# Patient Record
Sex: MALE | Race: WHITE | NOT HISPANIC OR LATINO | Employment: PART TIME | URBAN - NONMETROPOLITAN AREA
[De-identification: names, ages, dates, MRNs, and addresses within clinical notes are randomized per-mention and may not be internally consistent; named-entity substitution may affect disease eponyms.]

---
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
## 2024-08-10 ENCOUNTER — OFFICE VISIT (OUTPATIENT)
Dept: URGENT CARE | Facility: CLINIC | Age: 19
End: 2024-08-10
Payer: COMMERCIAL

## 2024-08-10 VITALS
SYSTOLIC BLOOD PRESSURE: 124 MMHG | OXYGEN SATURATION: 95 % | TEMPERATURE: 96.5 F | HEART RATE: 76 BPM | DIASTOLIC BLOOD PRESSURE: 76 MMHG | RESPIRATION RATE: 18 BRPM

## 2024-08-10 DIAGNOSIS — J02.9 SORE THROAT: ICD-10-CM

## 2024-08-10 DIAGNOSIS — H92.01 RIGHT EAR PAIN: Primary | ICD-10-CM

## 2024-08-10 LAB — S PYO AG THROAT QL: NEGATIVE

## 2024-08-10 PROCEDURE — 87880 STREP A ASSAY W/OPTIC: CPT | Performed by: PHYSICIAN ASSISTANT

## 2024-08-10 PROCEDURE — 99203 OFFICE O/P NEW LOW 30 MIN: CPT | Performed by: PHYSICIAN ASSISTANT

## 2024-08-10 NOTE — PROGRESS NOTES
[facility]        NAME: Kraig Cervantes is a 18 y.o. male  : 2005    MRN: [id number]  DATE: August 10, 2024  TIME: 4:01 PM    Assessment and Plan   Right ear pain [H92.01]  1. Right ear pain        2. Sore throat  POCT rapid ANTIGEN strepA        Rapid Strep negative. Patient offered throat culture but declined.     Patient Instructions   Patient was educated on sore throat and right ear pain.  Patient was educated on over-the-counter Tylenol and ibuprofen for pain control.  Patient may use over-the-counter Flonase for any nasal congestion.  Follow-up with PCP if symptoms persist.  Any chest pain or shortness of breath go directly to the emergency room    Given work note for today.      Follow up with PCP in 3-5 days.  Proceed to  ER if symptoms worsen.    If tests have been performed at [facility], our office will contact you with results if changes need to be made to the care plan discussed with you at the visit.  You can [facility] on [facility]    Chief Complaint     Chief Complaint   Patient presents with    Earache     Patient with ear pain since Wednesday         History of Present Illness   Patient is a pleasant 18-year-old male who reports to the urgent care complaining of sore throat and ear pain.  Patient was seen by PCP 3 days ago and was told no signs of an ear infection.  Admits history of headache and light sensitivity.  Denies any current chest pain or shortness of breath however does report chest pain and shortness of breath when he has a panic attack.  Patient was tested for COVID 3 days ago and was negative.  Allergies were reviewed in his chart.  Patient is requesting a work note        Earache   Associated symptoms include headaches and a sore throat.       Review of Systems   Review of Systems   HENT:  Positive for ear pain and sore throat.    Eyes:  Positive for photophobia.   Respiratory: Negative.     Cardiovascular: Negative.    Neurological:   Positive for headaches.   Psychiatric/Behavioral: Negative.           Current Medications     No current outpatient medications on file.    Current Allergies     Allergies as of 08/10/2024 - Reviewed 08/10/2024   Allergen Reaction Noted    Carbinoxamine Other (See Comments)     Other Other (See Comments) 01/31/2016            The following portions of the patient's history were reviewed and updated as appropriate: allergies, current medications, past family history, past medical history, past social history, past surgical history and problem list.     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      Medications have been verified.        Objective   /76   Pulse 76   Temp (!) 96.5 °F (35.8 °C) (Tympanic)   Resp 18   SpO2 95%   No LMP for male patient.       Physical Exam     Physical Exam  Vitals and nursing note reviewed.   Constitutional:       Appearance: Normal appearance.   HENT:      Head: Normocephalic.      Comments: No pressure over frontal or maxillary sinus     Right Ear: Tympanic membrane, ear canal and external ear normal.      Left Ear: Tympanic membrane, ear canal and external ear normal.      Ears:      Comments: NO bulging of B/L TM     Mouth/Throat:      Mouth: Mucous membranes are moist.      Pharynx: Posterior oropharyngeal erythema present.    Eyes:      Extraocular Movements: Extraocular movements intact.      Pupils: Pupils are equal, round, and reactive to light.   Cardiovascular:      Rate and Rhythm: Normal rate and regular rhythm.      Heart sounds: Normal heart sounds.   Pulmonary:      Breath sounds: Normal breath sounds. No wheezing.   Neurological:      General: No focal deficit present.      Mental Status: He is alert and oriented to person, place, and time.   Psychiatric:         Mood and Affect: Mood normal.         Behavior: Behavior normal.

## 2024-08-10 NOTE — LETTER
August 10, 2024     Patient: Kraig Cervantes   YOB: 2005   Date of Visit: 8/10/2024       To Whom it May Concern:    Kraig Cervantes was seen in my clinic on 8/10/2024.   If you have any questions or concerns, please don't hesitate to call.         Sincerely,          Katey Quintana PA-C        CC: No Recipients

## 2024-11-18 ENCOUNTER — OFFICE VISIT (OUTPATIENT)
Dept: URGENT CARE | Facility: CLINIC | Age: 19
End: 2024-11-18
Payer: COMMERCIAL

## 2024-11-18 VITALS
RESPIRATION RATE: 18 BRPM | SYSTOLIC BLOOD PRESSURE: 116 MMHG | HEART RATE: 72 BPM | DIASTOLIC BLOOD PRESSURE: 70 MMHG | BODY MASS INDEX: 25.84 KG/M2 | OXYGEN SATURATION: 97 % | WEIGHT: 175 LBS | TEMPERATURE: 97.2 F

## 2024-11-18 DIAGNOSIS — J06.9 BACTERIAL URI: Primary | ICD-10-CM

## 2024-11-18 DIAGNOSIS — B96.89 BACTERIAL URI: Primary | ICD-10-CM

## 2024-11-18 PROCEDURE — 99213 OFFICE O/P EST LOW 20 MIN: CPT | Performed by: NURSE PRACTITIONER

## 2024-11-18 RX ORDER — AZITHROMYCIN 250 MG/1
TABLET, FILM COATED ORAL
Qty: 6 TABLET | Refills: 0 | Status: SHIPPED | OUTPATIENT
Start: 2024-11-18 | End: 2024-11-22

## 2024-11-18 NOTE — PROGRESS NOTES
[facility]        NAME: Kraig Cervantes is a 19 y.o. male  : 2005    MRN: [id number]  DATE: 2024  TIME: 5:27 PM    Assessment and Plan   Bacterial URI [J06.9, B96.89]  1. Bacterial URI  azithromycin (ZITHROMAX) 250 mg tablet        Has been a week since symptoms onset -   Continues with fevers -   Will start course of zithromax -   F/u with pcp   Pt in agreement with plan of care    Patient Instructions     Follow up with PCP in 3-5 days.  Proceed to  ER if symptoms worsen.    Chief Complaint     Chief Complaint   Patient presents with    Cold Like Symptoms     Patient started with congestion, cough and sore throat, fevers and chest congestion. Patient states that cough is worse at night          History of Present Illness   Kraig Cervantes presents to the clinic c/o    Cold Like Symptoms (Patient started with congestion, cough and sore throat, fevers and chest congestion. Patient states that cough is worse at night )  Was seen a few days ago at [facility] for abd pain  - thought it was appendicitis but was told it was from eating acid.   Had cough at that time but didn't think much of it.   Cough is productive for white mucous.  Cough continues with occ abd discomfort, fevers   Brother ill also with uri symptoms that started after his.            Review of Systems   Review of Systems   All other systems reviewed and are negative.        Current Medications     No long-term medications on file.       Current Allergies     Allergies as of 2024 - Reviewed 2024   Allergen Reaction Noted    Carbinoxamine Other (See Comments)     Other Other (See Comments) 2016            The following portions of the patient's history were reviewed and updated as appropriate: allergies, current medications, past family history, past medical history, past social history, past surgical history and problem list.    Objective   /70   Pulse 72   Temp (!) 97.2 °F (36.2 °C)   Resp  18   Wt 79.4 kg (175 lb)   SpO2 97%   BMI 25.84 kg/m²        Physical Exam     Physical Exam  Vitals and nursing note reviewed.   Constitutional:       Appearance: Normal appearance. He is well-developed.   HENT:      Head: Normocephalic and atraumatic.      Right Ear: Hearing, tympanic membrane, ear canal and external ear normal.      Left Ear: Hearing, tympanic membrane, ear canal and external ear normal.      Nose: Mucosal edema and congestion present.      Right Sinus: No maxillary sinus tenderness or frontal sinus tenderness.      Left Sinus: No maxillary sinus tenderness or frontal sinus tenderness.      Mouth/Throat:      Mouth: Mucous membranes are moist.   Eyes:      General: Lids are normal.      Conjunctiva/sclera: Conjunctivae normal.      Pupils: Pupils are equal, round, and reactive to light.   Cardiovascular:      Rate and Rhythm: Normal rate and regular rhythm.      Pulses: Normal pulses.      Heart sounds: Normal heart sounds, S1 normal and S2 normal.   Pulmonary:      Effort: Pulmonary effort is normal.      Breath sounds: Normal breath sounds.   Musculoskeletal:      Cervical back: Normal range of motion and neck supple.   Skin:     General: Skin is warm and dry.   Neurological:      Mental Status: He is alert.   Psychiatric:         Speech: Speech normal.         Behavior: Behavior normal.         Thought Content: Thought content normal.         Judgment: Judgment normal.

## 2024-11-18 NOTE — PATIENT INSTRUCTIONS
"Patient Education     Cough, runny nose, and the common cold   The Basics   Written by the doctors and editors at [facility], [place]   What causes cough, runny nose, and other symptoms of the common cold? -- These symptoms are usually caused by a virus. Doctors also use the term \"viral upper respiratory infection\" or \"viral URI.\" Lots of different viruses can get into your nose, mouth, throat, or airways and cause cold symptoms.  Most people get better from a cold without any lasting problems. Even so, having a cold can be uncomfortable.  What are the symptoms of the common cold? -- Symptoms can include:   Sneezing   Coughing   Sniffling and runny nose   Sore throat   Chest congestion  In children, the common cold can also cause a fever. But adults do not usually get a fever when they have a cold.  Colds usually last about 3 to 7 days in adults and 10 days in children. But some people have symptoms for up to 2 weeks.  How can I tell if I have a cold or something else? -- Sometimes, it can be hard to tell if you have a cold or something else. Some cold symptoms can also be caused by other illnesses, such as COVID-19, the flu, or strep throat.  There are sometimes clues that can help you tell the difference:   COVID-19 often starts out very similar to a cold, although it can also cause a fever. If you have cold symptoms and have been around someone with COVID-19, you should get a test to find out if you have it, too.   The flu is more likely to cause fever, body aches, and extreme tiredness than a cold.   Strep throat usually causes severe throat pain. It can also cause a fever and swollen glands in the neck. People with strep throat usually do not have other cold symptoms like a stuffy nose or cough.  If you think that you might have an illness other than the common cold, call your doctor or nurse. They can tell you what to do.  Can medicine help with a cold? -- Usually, a cold gets better on its own and does not need " treatment. Because colds are usually caused by viruses, antibiotics will not help.  If you are a teen or an adult, you can try cough and cold medicines that you can get without a prescription. These medicines might help with your symptoms. But they can't cure your cold, or help you get well faster.  If you decide to try non-prescription cold medicines:   Read the directions on the label, and follow them carefully.   Do not combine 2 or more medicines that have acetaminophen in them. If you take too much acetaminophen, it can damage your liver.   If you have a heart condition, have high blood pressure, or take any prescription medicines, talk to your doctor or pharmacist before taking cold medicine. They can tell you which medicines are safe.  Some medicines are not safe for children:   If your child is younger than 6, do not give them any cold medicines. These medicines are not safe for young children. Even if your child is older than 6, cough and cold medicines are unlikely to help.   Never give aspirin to any child younger than 18 years old. In children, aspirin can cause a life-threatening condition called Reye syndrome.   When giving your child acetaminophen or other non-prescription medicines, never give more than the recommended dose.  Is there anything I can do on my own to feel better? -- Yes. You can:   Get plenty of rest.   Drink lots of fluids (water, juice, or broth) to stay hydrated. This will help replace any fluids lost if you have a runny nose or sweating from a fever. Warm tea or soup can help soothe a sore throat.   If the air in your home feels dry, use a cool-mist humidifier. This can help a stuffy nose and make it easier to breathe.   Use saline nose drops or spray to relieve stuffiness.   Avoid smoking, and stay away from places where people are smoking.  Can the common cold lead to more serious problems? -- In some cases, yes. In some people, having a cold can lead to:   Ear infections   Worse  asthma symptoms   Sinus infections   Pneumonia or bronchitis (infections of the lungs)  Can colds be prevented? -- There are some things you can do to keep germs from spreading:   Wash your hands with soap and water often (figure 1) - This can also help prevent the spread of other illnesses like the flu and COVID-19.   Cover your cough - Cough into your elbow instead of your hands. Teach children to do this, too. Throw away used tissues right away.   Clean surfaces - The germs that cause the common cold can live on tables, door handles, and other surfaces for at least 2 hours.   Stay home if you are sick - When you do need to be around other people, consider wearing a face mask until you are feeling better.  When should I call the doctor? -- Contact your doctor or nurse if you:   Lose your sense of taste or smell   Have a fever of more than 100.4°F (38°C) that comes with shaking chills, loss of appetite, or trouble breathing   Have a very bad sore throat   Have a fever and also have lung disease, such as emphysema or asthma   Have a cough that lasts longer than 10 days or starts getting worse   Have chest pain when you cough or breathe deeply, have trouble breathing, or cough up blood  If you are older than 65, or if you have any chronic medical conditions such as diabetes, contact your doctor or nurse any time you get a long-lasting cough.  Take your child to the emergency department if they:   Become confused or stop responding to you   Have trouble breathing or have to work hard to breathe  Contact your child's doctor or nurse if the child:   Loses their sense of taste or smell or won't eat foods that they ate before   Has a very bad sore throat   Refuses to drink anything for a long time   Is younger than 4 months   Has a fever and is not acting like themselves   Has a cough that lasts for more than 2 weeks and is not getting any better or is getting worse   Has a stuffed or runny nose that gets worse or does  not get any better after 10 days   Has red eyes or yellow goop coming out of their eyes   Has ear pain, pulls at their ears, or shows other signs of having an ear infection  All topics are updated as new evidence becomes available and our peer review process is complete.  This topic retrieved from [employer] on: Feb 26, 2024.  Topic 46003 Version 30.0  Release: 32.2.4 - C32.56  © 2024 UpToDate, Inc. and/or its affiliates. All rights reserved.  figure 1: How to wash your hands     Wet your hands with clean water, and apply a small amount of soap. Lather and rub hands together for at least 20 seconds. Clean your wrists, palms, backs of your hands, between your fingers, tips of your fingers, thumbs, and under and around your nails. Rinse well, and dry your hands using a clean towel.  Graphic 751407 Version 7.0  Consumer Information Use and Disclaimer   Disclaimer: This generalized information is a limited summary of diagnosis, treatment, and/or medication information. It is not meant to be comprehensive and should be used as a tool to help the user understand and/or assess potential diagnostic and treatment options. It does NOT include all information about conditions, treatments, medications, side effects, or risks that may apply to a specific patient. It is not intended to be medical advice or a substitute for the medical advice, diagnosis, or treatment of a health care provider based on the health care provider's examination and assessment of a patient's specific and unique circumstances. Patients must speak with a health care provider for complete information about their health, medical questions, and treatment options, including any risks or benefits regarding use of medications. This information does not endorse any treatments or medications as safe, effective, or approved for treating a specific patient. UpToDate, Inc. and its affiliates disclaim any warranty or liability relating to this information or the use  thereof.The use of this information is governed by the Terms of Use, available at https://www.wolterskluwer.com/en/know/clinical-effectiveness-terms. 2024© UpToDate, Inc. and its affiliates and/or licensors. All rights reserved.  Copyright   © 2024 [employer] and/or its affiliates. All rights reserved.

## 2025-04-17 ENCOUNTER — TELEPHONE (OUTPATIENT)
Dept: FAMILY MEDICINE CLINIC | Facility: CLINIC | Age: 20
End: 2025-04-17

## 2025-08-01 ENCOUNTER — OFFICE VISIT (OUTPATIENT)
Age: 20
End: 2025-08-01
Payer: COMMERCIAL

## 2025-08-01 VITALS
WEIGHT: 162.7 LBS | TEMPERATURE: 97.4 F | OXYGEN SATURATION: 97 % | BODY MASS INDEX: 24.66 KG/M2 | SYSTOLIC BLOOD PRESSURE: 118 MMHG | HEIGHT: 68 IN | HEART RATE: 82 BPM | RESPIRATION RATE: 16 BRPM | DIASTOLIC BLOOD PRESSURE: 60 MMHG

## 2025-08-01 DIAGNOSIS — J06.9 VIRAL URI: Primary | ICD-10-CM

## 2025-08-01 DIAGNOSIS — H65.03 NON-RECURRENT ACUTE SEROUS OTITIS MEDIA OF BOTH EARS: ICD-10-CM

## 2025-08-01 LAB — S PYO AG THROAT QL: NEGATIVE

## 2025-08-01 PROCEDURE — 87880 STREP A ASSAY W/OPTIC: CPT | Performed by: EMERGENCY MEDICINE

## 2025-08-01 PROCEDURE — 99213 OFFICE O/P EST LOW 20 MIN: CPT | Performed by: EMERGENCY MEDICINE

## 2025-08-01 RX ORDER — PREDNISONE 10 MG/1
TABLET ORAL
Qty: 21 TABLET | Refills: 0 | Status: SHIPPED | OUTPATIENT
Start: 2025-08-01